# Patient Record
(demographics unavailable — no encounter records)

---

## 2025-01-26 NOTE — PHYSICAL EXAM
[Normal Appearance] : normal appearance [Well Groomed] : well groomed [General Appearance - In No Acute Distress] : no acute distress [Edema] : no peripheral edema [Respiration, Rhythm And Depth] : normal respiratory rhythm and effort [Exaggerated Use Of Accessory Muscles For Inspiration] : no accessory muscle use [Abdomen Soft] : soft [Abdomen Tenderness] : non-tender [Costovertebral Angle Tenderness] : no ~M costovertebral angle tenderness [Urinary Bladder Findings] : the bladder was normal on palpation [Normal Station and Gait] : the gait and station were normal for the patient's age [] : no rash [No Focal Deficits] : no focal deficits [Oriented To Time, Place, And Person] : oriented to person, place, and time [Affect] : the affect was normal [Mood] : the mood was normal [No Palpable Adenopathy] : no palpable adenopathy [Chaperone Present] : A chaperone was present in the examining room during all aspects of the physical examination [de-identified] : vaginal atrophy, neg CST, no prolapse [FreeTextEntry2] : ELSIE Martin

## 2025-01-26 NOTE — HISTORY OF PRESENT ILLNESS
[FreeTextEntry1] : 75 yo Greek speaking F worried about bubbles in her urine Pt states that she first noticed in 3 yrs ago and is anxious Normal urine studies with PCP No bothersome LUTS except for some urinary urgency also with urinary urgency - 1 year No gross hematuria No UTI history Voiding every 2-3 hours, nocturia 1-2/night rarely urge incontinence - no pads or liners Drinks 8 cups of water, sometimes 1 cup of coffee dry mouth normal bowel movements 2 children,  last saw gyn more than 20 yrs ago not sexually active LMP = 25 yrs ago HLD - sinvastatin

## 2025-01-26 NOTE — HISTORY OF PRESENT ILLNESS
[FreeTextEntry1] : 77 yo Pashto speaking F worried about bubbles in her urine Pt states that she first noticed in 3 yrs ago and is anxious Normal urine studies with PCP No bothersome LUTS except for some urinary urgency also with urinary urgency - 1 year No gross hematuria No UTI history Voiding every 2-3 hours, nocturia 1-2/night rarely urge incontinence - no pads or liners Drinks 8 cups of water, sometimes 1 cup of coffee dry mouth normal bowel movements 2 children,  last saw gyn more than 20 yrs ago not sexually active LMP = 25 yrs ago HLD - sinvastatin

## 2025-01-26 NOTE — PHYSICAL EXAM
[Normal Appearance] : normal appearance [Well Groomed] : well groomed [General Appearance - In No Acute Distress] : no acute distress [Edema] : no peripheral edema [Respiration, Rhythm And Depth] : normal respiratory rhythm and effort [Exaggerated Use Of Accessory Muscles For Inspiration] : no accessory muscle use [Abdomen Soft] : soft [Abdomen Tenderness] : non-tender [Costovertebral Angle Tenderness] : no ~M costovertebral angle tenderness [Urinary Bladder Findings] : the bladder was normal on palpation [Normal Station and Gait] : the gait and station were normal for the patient's age [] : no rash [No Focal Deficits] : no focal deficits [Oriented To Time, Place, And Person] : oriented to person, place, and time [Affect] : the affect was normal [Mood] : the mood was normal [No Palpable Adenopathy] : no palpable adenopathy [Chaperone Present] : A chaperone was present in the examining room during all aspects of the physical examination [de-identified] : vaginal atrophy, neg CST, no prolapse [FreeTextEntry2] : ELSIE Martin